# Patient Record
Sex: MALE | Race: NATIVE HAWAIIAN OR OTHER PACIFIC ISLANDER
[De-identification: names, ages, dates, MRNs, and addresses within clinical notes are randomized per-mention and may not be internally consistent; named-entity substitution may affect disease eponyms.]

---

## 2017-04-21 ENCOUNTER — HOSPITAL ENCOUNTER (OUTPATIENT)
Dept: HOSPITAL 39 - LAB.O | Age: 77
Discharge: HOME | End: 2017-04-21
Attending: NURSE PRACTITIONER
Payer: MEDICARE

## 2017-04-21 DIAGNOSIS — I10: Primary | ICD-10-CM

## 2017-05-09 ENCOUNTER — HOSPITAL ENCOUNTER (OUTPATIENT)
Dept: HOSPITAL 39 - LAB | Age: 77
Discharge: HOME | End: 2017-05-09
Attending: NURSE PRACTITIONER
Payer: MEDICARE

## 2017-05-09 DIAGNOSIS — Z12.5: Primary | ICD-10-CM

## 2017-05-09 PROCEDURE — 36415 COLL VENOUS BLD VENIPUNCTURE: CPT

## 2017-05-30 ENCOUNTER — HOSPITAL ENCOUNTER (OUTPATIENT)
Dept: HOSPITAL 39 - LAB.O | Age: 77
Discharge: HOME | End: 2017-05-30
Attending: NURSE PRACTITIONER
Payer: MEDICARE

## 2017-05-30 DIAGNOSIS — R97.20: Primary | ICD-10-CM

## 2017-06-12 ENCOUNTER — HOSPITAL ENCOUNTER (OUTPATIENT)
Dept: HOSPITAL 39 - RESP | Age: 77
End: 2017-06-12
Attending: NURSE PRACTITIONER
Payer: MEDICARE

## 2017-06-12 DIAGNOSIS — J44.9: Primary | ICD-10-CM

## 2017-06-12 PROCEDURE — 94060 EVALUATION OF WHEEZING: CPT

## 2017-09-27 ENCOUNTER — HOSPITAL ENCOUNTER (OUTPATIENT)
Dept: HOSPITAL 39 - LAB.O | Age: 77
End: 2017-09-27
Attending: UROLOGY
Payer: MEDICARE

## 2017-09-27 DIAGNOSIS — R97.20: Primary | ICD-10-CM

## 2018-04-04 ENCOUNTER — HOSPITAL ENCOUNTER (OUTPATIENT)
Dept: HOSPITAL 39 - LAB.O | Age: 78
Discharge: HOME | End: 2018-04-04
Attending: UROLOGY
Payer: COMMERCIAL

## 2018-04-04 DIAGNOSIS — R97.20: Primary | ICD-10-CM

## 2018-10-03 ENCOUNTER — HOSPITAL ENCOUNTER (OUTPATIENT)
Dept: HOSPITAL 39 - LAB.O | Age: 78
End: 2018-10-03
Attending: UROLOGY
Payer: COMMERCIAL

## 2018-10-03 DIAGNOSIS — R97.20: Primary | ICD-10-CM

## 2019-03-06 ENCOUNTER — HOSPITAL ENCOUNTER (OUTPATIENT)
Dept: HOSPITAL 39 - LAB.O | Age: 79
End: 2019-03-06
Attending: NURSE PRACTITIONER
Payer: COMMERCIAL

## 2019-03-06 DIAGNOSIS — R07.89: Primary | ICD-10-CM

## 2019-03-06 NOTE — RAD
2 view chest



INDICATION: Chest pain



COMPARISON: November 2015



IMPRESSION:



Increasing interstitial markings suggesting interstitial fibrosis

versus edema less likely. Heart size. Mildly hyperexpanded lungs.

No lobar consolidation. No pleural effusion or pneumothorax.



Electronically signed by:  Jeancarlos Burciaga MD  3/6/2019 2:29 PM

[facility] Workstation: [id number]

## 2019-07-30 ENCOUNTER — HOSPITAL ENCOUNTER (OUTPATIENT)
Dept: HOSPITAL 39 - LAB.O | Age: 79
End: 2019-07-30
Attending: NURSE PRACTITIONER
Payer: COMMERCIAL

## 2019-07-30 DIAGNOSIS — J44.1: Primary | ICD-10-CM

## 2019-07-30 NOTE — RAD
EXAM DESCRIPTION: Chest,2 Views



CLINICAL HISTORY: COPD



COMPARISON: 3/6/2019



TECHNIQUE: PA/lateral



FINDINGS/IMPRESSION: 



The lungs are emphysematous with moderate background interstitial

scarring/fibrosis. No focal consolidation, significant

pneumothorax or pleural effusion. The heart is normal in size. No

acute osseous abnormality.



Subtle opacities within the bilateral lower lungs are noted and

may represent overlapping costal shadows versus partially

calcified pulmonary nodules. Follow-up CT chest is recommended

for further evaluation.







Electronically signed by:  Terell Sanchez DO  7/30/2019 4:30 PM CDT

Workstation: [id number]

## 2019-12-30 ENCOUNTER — HOSPITAL ENCOUNTER (OUTPATIENT)
Dept: HOSPITAL 39 - RAD | Age: 79
End: 2019-12-30
Attending: NURSE PRACTITIONER
Payer: COMMERCIAL

## 2019-12-30 DIAGNOSIS — J44.9: Primary | ICD-10-CM

## 2019-12-30 NOTE — RAD
EXAM DESCRIPTION: Chest,2 Views



CLINICAL HISTORY: CHRONIC OBSTRUCTIVE PULMONARY DISEASE



COMPARISON: Previous study July 30, 2019



TECHNIQUE: PA/lateral



FINDINGS: Nodular densities in the mid and lower lung zones

thought to be nipple shadows plus prominent anterior ends of the

ribs. No definite pulmonary nodule or mass is seen on the lateral

view to suggest the need for chest CT at this time. Patient had a

previous chest CT November 30, 2015 which was negative for

pulmonary nodules or masses. Pattern of densities appear similar

to the previous chest x-ray July 30, 2019. Heart size is normal

with normal pulmonary vascularity. No pleural effusion or

pneumothorax. Lungs are hyperexpanded with no consolidating

infiltrate. Lateral view shows intact sternum and prominent

spurring in the mid to lower T-spine.



IMPRESSION:



No acute process is identified in the chest. See above.



Electronically signed by:  Sd Price MD  12/30/2019 11:37

AM [facility] Workstation: [id number]